# Patient Record
Sex: FEMALE | Race: WHITE | NOT HISPANIC OR LATINO | Employment: FULL TIME | URBAN - METROPOLITAN AREA
[De-identification: names, ages, dates, MRNs, and addresses within clinical notes are randomized per-mention and may not be internally consistent; named-entity substitution may affect disease eponyms.]

---

## 2023-12-08 ENCOUNTER — TELEPHONE (OUTPATIENT)
Dept: OBSTETRICS AND GYNECOLOGY | Facility: CLINIC | Age: 58
End: 2023-12-08
Payer: COMMERCIAL

## 2023-12-08 DIAGNOSIS — Z12.31 ENCOUNTER FOR SCREENING MAMMOGRAM FOR MALIGNANT NEOPLASM OF BREAST: ICD-10-CM

## 2023-12-08 NOTE — TELEPHONE ENCOUNTER
Called pt, no answer, left voicemail for return call  Re: mamm in place for schedule  
Request to [facility]  
Patient/Caregiver provided printed discharge information.

## 2023-12-26 ENCOUNTER — ANCILLARY PROCEDURE (OUTPATIENT)
Dept: RADIOLOGY | Facility: CLINIC | Age: 58
End: 2023-12-26
Payer: COMMERCIAL

## 2023-12-26 DIAGNOSIS — Z12.31 ENCOUNTER FOR SCREENING MAMMOGRAM FOR MALIGNANT NEOPLASM OF BREAST: ICD-10-CM

## 2023-12-26 PROCEDURE — 77063 BREAST TOMOSYNTHESIS BI: CPT | Performed by: RADIOLOGY

## 2023-12-26 PROCEDURE — 77067 SCR MAMMO BI INCL CAD: CPT

## 2023-12-26 PROCEDURE — 77067 SCR MAMMO BI INCL CAD: CPT | Performed by: RADIOLOGY

## 2024-01-19 ENCOUNTER — TELEPHONE (OUTPATIENT)
Dept: OBSTETRICS AND GYNECOLOGY | Facility: CLINIC | Age: 59
End: 2024-01-19
Payer: COMMERCIAL

## 2024-01-19 NOTE — TELEPHONE ENCOUNTER
contacted pt  name and  verified  Spoke with pt gave mammogram results  Pt request copy by mail does not want to sign up for [employer]  Placed in mail basket today  pt verbalized understanding  no further questions or concerns at this time

## 2024-03-07 ENCOUNTER — APPOINTMENT (OUTPATIENT)
Dept: OBSTETRICS AND GYNECOLOGY | Facility: CLINIC | Age: 59
End: 2024-03-07
Payer: COMMERCIAL

## 2024-03-28 ENCOUNTER — OFFICE VISIT (OUTPATIENT)
Dept: OBSTETRICS AND GYNECOLOGY | Facility: CLINIC | Age: 59
End: 2024-03-28
Payer: COMMERCIAL

## 2024-03-28 VITALS
SYSTOLIC BLOOD PRESSURE: 108 MMHG | DIASTOLIC BLOOD PRESSURE: 70 MMHG | BODY MASS INDEX: 28.16 KG/M2 | WEIGHT: 153 LBS | HEIGHT: 62 IN

## 2024-03-28 DIAGNOSIS — Z12.31 ENCOUNTER FOR SCREENING MAMMOGRAM FOR MALIGNANT NEOPLASM OF BREAST: ICD-10-CM

## 2024-03-28 DIAGNOSIS — Z01.419 WELL WOMAN EXAM WITH ROUTINE GYNECOLOGICAL EXAM: Primary | ICD-10-CM

## 2024-03-28 PROBLEM — R03.0 ELEVATED BLOOD-PRESSURE READING WITHOUT DIAGNOSIS OF HYPERTENSION: Status: RESOLVED | Noted: 2024-03-28 | Resolved: 2024-03-28

## 2024-03-28 PROBLEM — L25.9 CONTACT DERMATITIS: Status: ACTIVE | Noted: 2024-03-28

## 2024-03-28 PROBLEM — M65.4 RADIAL STYLOID TENOSYNOVITIS: Status: ACTIVE | Noted: 2021-06-07

## 2024-03-28 PROBLEM — D68.9 BLOOD COAGULATION DISORDER (MULTI): Status: ACTIVE | Noted: 2024-03-28

## 2024-03-28 PROBLEM — D22.9 NUMEROUS MOLES: Status: ACTIVE | Noted: 2024-03-28

## 2024-03-28 PROBLEM — I63.9 CEREBROVASCULAR ACCIDENT (MULTI): Status: ACTIVE | Noted: 2024-03-28

## 2024-03-28 PROCEDURE — 99396 PREV VISIT EST AGE 40-64: CPT | Performed by: OBSTETRICS & GYNECOLOGY

## 2024-03-28 PROCEDURE — 1036F TOBACCO NON-USER: CPT | Performed by: OBSTETRICS & GYNECOLOGY

## 2024-03-28 RX ORDER — ASPIRIN 81 MG/1
TABLET ORAL
COMMUNITY

## 2024-03-28 NOTE — PROGRESS NOTES
Subjective   Patient ID: Gudelia Morgan is a 58 y.o. female who presents for Annual Exam (No complaints /Last PAP= 11/2022 negative //Last MAMMO= 12/2024 due //Last ColoGard= 2022 neg//Sal. EMANI MA II/).  Annual gyn exam. Screenings current.   Review of Systems   All other systems reviewed and are negative.    Objective   Physical Exam  Vitals reviewed.   Constitutional:       Appearance: Normal appearance.   HENT:      Head: Normocephalic and atraumatic.      Right Ear: External ear normal.      Left Ear: External ear normal.      Nose: Nose normal.      Mouth/Throat:      Mouth: Mucous membranes are moist.   Eyes:      Extraocular Movements: Extraocular movements intact.   Neck:      Thyroid: No thyroid mass or thyromegaly.   Cardiovascular:      Rate and Rhythm: Normal rate and regular rhythm.      Heart sounds: Normal heart sounds.   Pulmonary:      Effort: Pulmonary effort is normal.      Breath sounds: Normal breath sounds.   Chest:   Breasts:     Right: Normal.      Left: Normal.   Abdominal:      General: Abdomen is flat. Bowel sounds are normal.      Palpations: Abdomen is soft.      Tenderness: There is no abdominal tenderness. There is no right CVA tenderness or left CVA tenderness.   Genitourinary:     General: Normal vulva.      Exam position: Lithotomy position.      Labia:         Right: No lesion.         Left: No lesion.       Urethra: No prolapse, urethral swelling or urethral lesion.      Vagina: Normal.      Cervix: Normal.      Uterus: Normal.       Adnexa: Right adnexa normal and left adnexa normal.   Musculoskeletal:         General: Normal range of motion.      Right shoulder: Normal.      Left shoulder: Normal.      Cervical back: Normal, normal range of motion and neck supple.      Thoracic back: Normal.      Lumbar back: Normal.      Right hip: Normal.      Left hip: Normal.      Right upper leg: Normal.      Left upper leg: Normal.      Right knee: Normal.      Left knee: Normal.       Right lower leg: Normal.      Left lower leg: Normal.   Lymphadenopathy:      Upper Body:      Right upper body: No axillary adenopathy.      Left upper body: No axillary adenopathy.      Lower Body: No right inguinal adenopathy. No left inguinal adenopathy.   Skin:     General: Skin is warm and dry.   Neurological:      General: No focal deficit present.      Mental Status: She is alert and oriented to person, place, and time.      Cranial Nerves: Cranial nerves 2-12 are intact.      Motor: Motor function is intact.   Psychiatric:         Attention and Perception: Attention and perception normal.         Mood and Affect: Mood and affect normal.         Behavior: Behavior normal.         Cognition and Memory: Cognition normal.         Judgment: Judgment normal.     Assessment/Plan   Problem List Items Addressed This Visit    None  Visit Diagnoses         Codes    Well woman exam with routine gynecological exam    -  Primary Z01.419    Encounter for screening mammogram for malignant neoplasm of breast     Z12.31          Teresa Bran MD 03/28/24 8:20 AM

## 2024-05-09 ENCOUNTER — LAB (OUTPATIENT)
Dept: LAB | Facility: LAB | Age: 59
End: 2024-05-09
Payer: COMMERCIAL

## 2024-05-09 ENCOUNTER — TELEPHONE (OUTPATIENT)
Dept: OBSTETRICS AND GYNECOLOGY | Facility: CLINIC | Age: 59
End: 2024-05-09
Payer: COMMERCIAL

## 2024-05-09 DIAGNOSIS — R35.0 URINARY FREQUENCY: ICD-10-CM

## 2024-05-09 PROCEDURE — 87186 SC STD MICRODIL/AGAR DIL: CPT

## 2024-05-09 PROCEDURE — 87086 URINE CULTURE/COLONY COUNT: CPT

## 2024-05-09 RX ORDER — CIPROFLOXACIN 500 MG/1
500 TABLET ORAL 2 TIMES DAILY
Qty: 10 TABLET | Refills: 0 | Status: SHIPPED | OUTPATIENT
Start: 2024-05-09 | End: 2024-05-14

## 2024-05-09 NOTE — TELEPHONE ENCOUNTER
Pt last seen for AE 3/28/2024.  Pt c/o UTI s/s that started last night; urinary frequency/urgency/blood in urine.  Pt denies pain at this time.   Pt denies hx of UTI's.   Allergies and pharmacy reviewed with pt.   Informed pt she will need to complete outpatient urine culture.   Order pended if approved.  Once filed will make pt aware.

## 2024-05-09 NOTE — TELEPHONE ENCOUNTER
Spoke with pt-aware order filed for UC/Rx for Cipro sent to pharmacy on file.   Pt aware to have UC completed prior to starting abx.  Pt aware she will be contacted once UC results available.

## 2024-05-11 LAB — BACTERIA UR CULT: ABNORMAL

## 2024-06-18 ENCOUNTER — TELEPHONE (OUTPATIENT)
Dept: OBSTETRICS AND GYNECOLOGY | Facility: CLINIC | Age: 59
End: 2024-06-18

## 2024-06-18 ENCOUNTER — LAB (OUTPATIENT)
Dept: LAB | Facility: LAB | Age: 59
End: 2024-06-18
Payer: COMMERCIAL

## 2024-06-18 DIAGNOSIS — R35.0 URINARY FREQUENCY: ICD-10-CM

## 2024-06-18 LAB
APPEARANCE UR: ABNORMAL
BACTERIA #/AREA URNS AUTO: ABNORMAL /HPF
BILIRUB UR STRIP.AUTO-MCNC: NEGATIVE MG/DL
COLOR UR: YELLOW
GLUCOSE UR STRIP.AUTO-MCNC: NORMAL MG/DL
HYALINE CASTS #/AREA URNS AUTO: ABNORMAL /LPF
KETONES UR STRIP.AUTO-MCNC: NEGATIVE MG/DL
LEUKOCYTE ESTERASE UR QL STRIP.AUTO: ABNORMAL
MUCOUS THREADS #/AREA URNS AUTO: ABNORMAL /LPF
NITRITE UR QL STRIP.AUTO: NEGATIVE
PH UR STRIP.AUTO: 6 [PH]
PROT UR STRIP.AUTO-MCNC: ABNORMAL MG/DL
RBC # UR STRIP.AUTO: ABNORMAL /UL
RBC #/AREA URNS AUTO: >20 /HPF
SP GR UR STRIP.AUTO: 1.02
SQUAMOUS #/AREA URNS AUTO: ABNORMAL /HPF
UROBILINOGEN UR STRIP.AUTO-MCNC: NORMAL MG/DL
WBC #/AREA URNS AUTO: >50 /HPF

## 2024-06-18 PROCEDURE — 87086 URINE CULTURE/COLONY COUNT: CPT

## 2024-06-18 PROCEDURE — 81001 URINALYSIS AUTO W/SCOPE: CPT

## 2024-06-18 RX ORDER — NITROFURANTOIN 25; 75 MG/1; MG/1
100 CAPSULE ORAL 2 TIMES DAILY
Qty: 14 CAPSULE | Refills: 0 | Status: SHIPPED | OUTPATIENT
Start: 2024-06-18 | End: 2024-06-25

## 2024-06-18 NOTE — TELEPHONE ENCOUNTER
contacted pt  name and  verified  Spoke with pt made aware rx to pharmacy  Pt aware to leave sample before starting medication  pt verbalized understanding  no further questions or concerns at this time

## 2024-06-19 LAB — BACTERIA UR CULT: NORMAL

## 2024-06-20 ENCOUNTER — TELEPHONE (OUTPATIENT)
Dept: OBSTETRICS AND GYNECOLOGY | Facility: CLINIC | Age: 59
End: 2024-06-20
Payer: COMMERCIAL

## 2024-06-20 NOTE — TELEPHONE ENCOUNTER
contacted pt  name and  verified  Pt states symptoms have improved since starting medication. Want to know if she should finish?  pt verbalized understanding  no further questions or concerns at this time

## 2024-06-20 NOTE — TELEPHONE ENCOUNTER
----- Message from Bianca Angelo RN sent at 6/20/2024  9:15 AM EDT -----    ----- Message -----  From: Teresa Bran MD  Sent: 6/19/2024   5:10 PM EDT  To: [facility] Staff    Culture negative. Have symptoms improved?

## 2024-06-20 NOTE — TELEPHONE ENCOUNTER
----- Message from Teresa Bran MD sent at 6/20/2024 11:46 AM EDT -----  Yes, if she is improving I would recommend she finish it.   ----- Message -----  From: Katja Toth RN  Sent: 6/20/2024   9:51 AM EDT  To: Teresa Bran MD    Spoke with pt states symptoms have improved since taking medication. Wants top know if she should finish medication since she has started it Tuesday night?  ----- Message -----  From: Bianca Angelo RN  Sent: 6/20/2024   9:15 AM EDT  To: [facility] Clinical Support Staff      ----- Message -----  From: Teresa Bran MD  Sent: 6/19/2024   5:10 PM EDT  To: [facility] Clinical Support Staff    Culture negative. Have symptoms improved?

## 2024-06-20 NOTE — TELEPHONE ENCOUNTER
contacted pt  name and  verified  Pt aware to finish medication as she reported it is improving her symptoms  pt verbalized understanding  no further questions or concerns at this time

## 2025-01-06 ENCOUNTER — HOSPITAL ENCOUNTER (OUTPATIENT)
Dept: RADIOLOGY | Facility: CLINIC | Age: 60
Discharge: HOME | End: 2025-01-06
Payer: COMMERCIAL

## 2025-01-06 VITALS — WEIGHT: 150 LBS | BODY MASS INDEX: 27.44 KG/M2

## 2025-01-06 DIAGNOSIS — Z12.31 ENCOUNTER FOR SCREENING MAMMOGRAM FOR MALIGNANT NEOPLASM OF BREAST: ICD-10-CM

## 2025-01-06 PROCEDURE — 77063 BREAST TOMOSYNTHESIS BI: CPT | Performed by: RADIOLOGY

## 2025-01-06 PROCEDURE — 77067 SCR MAMMO BI INCL CAD: CPT

## 2025-01-06 PROCEDURE — 77067 SCR MAMMO BI INCL CAD: CPT | Performed by: RADIOLOGY

## 2025-04-24 ENCOUNTER — APPOINTMENT (OUTPATIENT)
Dept: OBSTETRICS AND GYNECOLOGY | Facility: CLINIC | Age: 60
End: 2025-04-24
Payer: COMMERCIAL

## 2025-04-24 VITALS
DIASTOLIC BLOOD PRESSURE: 60 MMHG | HEIGHT: 62 IN | BODY MASS INDEX: 28.89 KG/M2 | SYSTOLIC BLOOD PRESSURE: 100 MMHG | WEIGHT: 157 LBS

## 2025-04-24 DIAGNOSIS — N95.0 POSTMENOPAUSAL BLEEDING: ICD-10-CM

## 2025-04-24 DIAGNOSIS — Z13.1 SCREENING FOR DIABETES MELLITUS: ICD-10-CM

## 2025-04-24 DIAGNOSIS — Z13.0 SCREENING FOR DEFICIENCY ANEMIA: ICD-10-CM

## 2025-04-24 DIAGNOSIS — Z12.31 ENCOUNTER FOR SCREENING MAMMOGRAM FOR MALIGNANT NEOPLASM OF BREAST: ICD-10-CM

## 2025-04-24 DIAGNOSIS — Z01.419 WELL WOMAN EXAM WITH ROUTINE GYNECOLOGICAL EXAM: Primary | ICD-10-CM

## 2025-04-24 DIAGNOSIS — Z13.29 SCREENING FOR THYROID DISORDER: ICD-10-CM

## 2025-04-24 PROCEDURE — 99396 PREV VISIT EST AGE 40-64: CPT | Performed by: OBSTETRICS & GYNECOLOGY

## 2025-04-24 PROCEDURE — 1036F TOBACCO NON-USER: CPT | Performed by: OBSTETRICS & GYNECOLOGY

## 2025-04-24 PROCEDURE — 3008F BODY MASS INDEX DOCD: CPT | Performed by: OBSTETRICS & GYNECOLOGY

## 2025-04-24 ASSESSMENT — ENCOUNTER SYMPTOMS
GASTROINTESTINAL NEGATIVE: 0
NEUROLOGICAL NEGATIVE: 0
EYES NEGATIVE: 0
CARDIOVASCULAR NEGATIVE: 0
HEMATOLOGIC/LYMPHATIC NEGATIVE: 0
MUSCULOSKELETAL NEGATIVE: 0
CONSTITUTIONAL NEGATIVE: 0
ALLERGIC/IMMUNOLOGIC NEGATIVE: 0
ENDOCRINE NEGATIVE: 0
RESPIRATORY NEGATIVE: 0
PSYCHIATRIC NEGATIVE: 0

## 2025-04-24 ASSESSMENT — PAIN SCALES - GENERAL: PAINLEVEL_OUTOF10: 0-NO PAIN

## 2025-04-25 ENCOUNTER — HOSPITAL ENCOUNTER (OUTPATIENT)
Dept: RADIOLOGY | Facility: CLINIC | Age: 60
Discharge: HOME | End: 2025-04-25
Payer: COMMERCIAL

## 2025-04-25 DIAGNOSIS — N95.0 POSTMENOPAUSAL BLEEDING: ICD-10-CM

## 2025-04-25 LAB
ALBUMIN SERPL-MCNC: 4.4 G/DL (ref 3.6–5.1)
ALP SERPL-CCNC: 62 U/L (ref 37–153)
ALT SERPL-CCNC: 18 U/L (ref 6–29)
ANION GAP SERPL CALCULATED.4IONS-SCNC: 8 MMOL/L (CALC) (ref 7–17)
AST SERPL-CCNC: 18 U/L (ref 10–35)
BILIRUB SERPL-MCNC: 0.4 MG/DL (ref 0.2–1.2)
BUN SERPL-MCNC: 22 MG/DL (ref 7–25)
CALCIUM SERPL-MCNC: 9.5 MG/DL (ref 8.6–10.4)
CHLORIDE SERPL-SCNC: 108 MMOL/L (ref 98–110)
CO2 SERPL-SCNC: 25 MMOL/L (ref 20–32)
CREAT SERPL-MCNC: 1 MG/DL (ref 0.5–1.03)
EGFRCR SERPLBLD CKD-EPI 2021: 65 ML/MIN/1.73M2
ERYTHROCYTE [DISTWIDTH] IN BLOOD BY AUTOMATED COUNT: 13.6 % (ref 11–15)
GLUCOSE SERPL-MCNC: 103 MG/DL (ref 65–139)
HCT VFR BLD AUTO: 44.4 % (ref 35–45)
HGB BLD-MCNC: 14.4 G/DL (ref 11.7–15.5)
MCH RBC QN AUTO: 28.7 PG (ref 27–33)
MCHC RBC AUTO-ENTMCNC: 32.4 G/DL (ref 32–36)
MCV RBC AUTO: 88.6 FL (ref 80–100)
PLATELET # BLD AUTO: 247 THOUSAND/UL (ref 140–400)
PMV BLD REES-ECKER: 11.3 FL (ref 7.5–12.5)
POTASSIUM SERPL-SCNC: 4.4 MMOL/L (ref 3.5–5.3)
PROT SERPL-MCNC: 6.6 G/DL (ref 6.1–8.1)
RBC # BLD AUTO: 5.01 MILLION/UL (ref 3.8–5.1)
SODIUM SERPL-SCNC: 141 MMOL/L (ref 135–146)
TSH SERPL-ACNC: 1.74 MIU/L (ref 0.4–4.5)
WBC # BLD AUTO: 6.1 THOUSAND/UL (ref 3.8–10.8)

## 2025-04-25 PROCEDURE — 76856 US EXAM PELVIC COMPLETE: CPT

## 2025-04-29 ENCOUNTER — TELEPHONE (OUTPATIENT)
Dept: OBSTETRICS AND GYNECOLOGY | Facility: CLINIC | Age: 60
End: 2025-04-29
Payer: COMMERCIAL

## 2025-05-22 ENCOUNTER — APPOINTMENT (OUTPATIENT)
Dept: OBSTETRICS AND GYNECOLOGY | Facility: CLINIC | Age: 60
End: 2025-05-22
Payer: COMMERCIAL

## 2025-05-23 ENCOUNTER — TELEPHONE (OUTPATIENT)
Dept: OBSTETRICS AND GYNECOLOGY | Facility: CLINIC | Age: 60
End: 2025-05-23

## 2025-05-23 ENCOUNTER — APPOINTMENT (OUTPATIENT)
Dept: OBSTETRICS AND GYNECOLOGY | Facility: CLINIC | Age: 60
End: 2025-05-23
Payer: COMMERCIAL

## 2025-05-23 VITALS
WEIGHT: 157 LBS | BODY MASS INDEX: 28.89 KG/M2 | SYSTOLIC BLOOD PRESSURE: 120 MMHG | HEIGHT: 62 IN | DIASTOLIC BLOOD PRESSURE: 72 MMHG

## 2025-05-23 DIAGNOSIS — N95.0 POSTMENOPAUSAL BLEEDING: Primary | ICD-10-CM

## 2025-05-23 PROCEDURE — 58555 HYSTEROSCOPY DX SEP PROC: CPT | Performed by: OBSTETRICS & GYNECOLOGY

## 2025-05-23 RX ORDER — KETOROLAC TROMETHAMINE 30 MG/ML
30 INJECTION, SOLUTION INTRAMUSCULAR; INTRAVENOUS ONCE
Status: DISCONTINUED | OUTPATIENT
Start: 2025-05-23 | End: 2025-05-23

## 2025-05-23 ASSESSMENT — ENCOUNTER SYMPTOMS
CARDIOVASCULAR NEGATIVE: 0
ALLERGIC/IMMUNOLOGIC NEGATIVE: 0
NEUROLOGICAL NEGATIVE: 0
MUSCULOSKELETAL NEGATIVE: 0
ENDOCRINE NEGATIVE: 0
RESPIRATORY NEGATIVE: 0
CONSTITUTIONAL NEGATIVE: 0
EYES NEGATIVE: 0
PSYCHIATRIC NEGATIVE: 0
HEMATOLOGIC/LYMPHATIC NEGATIVE: 0
GASTROINTESTINAL NEGATIVE: 0

## 2025-05-23 ASSESSMENT — PAIN SCALES - GENERAL: PAINLEVEL_OUTOF10: 0-NO PAIN

## 2025-05-23 NOTE — PROGRESS NOTES
Patient ID: Gudelia Morgan is a 59 y.o. female who presents for .    Procedure: Diagnostic Hysteroscopy   Preop diagnosis: Postmenopausal bleeding  Post op diagnosis: Postmenopausal bleeding, Atrophic endometrium.     Anesthesia: 20 ml 1% lidocaine paracervical block  IV: None   EBL: 0  Specimens: None   Complications: None   Risks benefits and alternatives of the procedure explained to the patient and informed consent was obtained.  Time out was performed. The patient was placed in the dorsal lithotomy position and a sterile speculum was placed in the vagina. The cervix was sterilized with Betadine x3. Paracervical block with lidocaine 1% was administered.   Tenaculum was used to hold the anterior cervix  Uterus sounded to 7 cm.  The hysteroscope was placed in the cervix and advanced into the uterine cavity. Normal saline was used for distension media. Findings noted as below.   All instruments were then removed. Good hemostasis was noted. Patient tolerated the procedure well and discharged in stable condition.   Findings:   Cavity: Smooth cavity no lesions noted  Ostia: Bilateral tubal ostia visualized  Additional Notes:  Atrophic appearing endometrial cavity.     I performed the procedure myself.

## 2025-05-23 NOTE — TELEPHONE ENCOUNTER
Pt returned call.  Pt verified by name and .  Pt states she does not want any medication before her procedure.  Pt has no questions at this time.

## 2025-05-23 NOTE — TELEPHONE ENCOUNTER
Nurse attempted to reach pt left voicemail.  Pt is scheduled for hysteroscopy this morning.  Per Dr. Bran nurse to call pt to see if she would like rx before procedure and she needs a drive if so.  Nurse sent pt my chart message also.

## 2025-05-23 NOTE — TELEPHONE ENCOUNTER
Nurs returned pt's call.  Left message for pt to return call.  Per Dr. Bran pt may have rx before procedure, needs  if so.